# Patient Record
Sex: FEMALE | Employment: UNEMPLOYED | URBAN - NONMETROPOLITAN AREA
[De-identification: names, ages, dates, MRNs, and addresses within clinical notes are randomized per-mention and may not be internally consistent; named-entity substitution may affect disease eponyms.]

---

## 2024-05-23 ENCOUNTER — OUTSIDE SERVICES (OUTPATIENT)
Dept: FAMILY MEDICINE CLINIC | Age: 74
End: 2024-05-23

## 2024-05-23 DIAGNOSIS — I10 ESSENTIAL HYPERTENSION: ICD-10-CM

## 2024-05-23 DIAGNOSIS — M16.0 OSTEOARTHRITIS OF BOTH HIPS, UNSPECIFIED OSTEOARTHRITIS TYPE: ICD-10-CM

## 2024-05-23 DIAGNOSIS — R26.2 DIFFICULTY WALKING: ICD-10-CM

## 2024-05-23 DIAGNOSIS — I26.99 PULMONARY EMBOLISM, UNSPECIFIED CHRONICITY, UNSPECIFIED PULMONARY EMBOLISM TYPE, UNSPECIFIED WHETHER ACUTE COR PULMONALE PRESENT (HCC): ICD-10-CM

## 2024-05-23 DIAGNOSIS — M48.07 SPINAL STENOSIS OF LUMBOSACRAL REGION: Primary | ICD-10-CM

## 2024-05-23 NOTE — PROGRESS NOTES
5/23/2024    Allie Singh  1950    This resident is being seen today for a skilled evaluation visit.  She is a resident who has long-term medical conditions including obesity, atherosclerotic heart disease, chronic obstructive pulmonary disease, difficulty walking, bilateral primary osteoarthritis of the hip, hyperlipidemia, essential hypertension, hypothyroidism, depression, UTI, pulmonary embolism, spinal stenosis to the lumbar sacral region tobacco abuse.  She is a 74 y.o. female resident who is being seen today for skilled services after she was admitted here for physical therapy as well as Occupational Therapy.  She is a resident who will presented to the emergency room with inability to ambulate for approximately 3 weeks.  She was having severe back pain with hip pain with burning sensation to her feet along with some increased edema and had no traumatic injuries.  She did have an ultrasound of the lower extremities with no evidence of DVT.  Her D-dimer was elevated with chest x-ray showing no active pulmonary disease so she was given the benefit of the CT of the pelvis with evidence of advanced degenerative changes to the lower lumbar spine with advanced canal stenosis and neuroforaminal narrowing and advanced degenerative changes of the hips without acute fractures.  She was empirically treated for a UTI at the time of her admission.  At this time, she she does indicate that she has ongoing pain especially to her right hip her lower extremities and her back.  She was very specific that she wanted to increase her Neurontin and change the time of her administration of her Percocet.  I did speak with staff regarding this and I was cautioned to proceed carefully as she does appear to be medication seeking.  She has no headaches or dizziness, sore throat, chest pain, nausea or vomiting, constipation or diarrhea, fever or chills, syncopal events or seizure activity.        Medications:  Albuterol sulfate 2

## 2024-05-30 ENCOUNTER — OUTSIDE SERVICES (OUTPATIENT)
Dept: FAMILY MEDICINE CLINIC | Age: 74
End: 2024-05-30

## 2024-05-30 DIAGNOSIS — E78.2 MIXED HYPERLIPIDEMIA: ICD-10-CM

## 2024-05-30 DIAGNOSIS — E03.9 HYPOTHYROIDISM, UNSPECIFIED TYPE: ICD-10-CM

## 2024-05-30 DIAGNOSIS — M48.07 SPINAL STENOSIS OF LUMBOSACRAL REGION: ICD-10-CM

## 2024-05-30 DIAGNOSIS — M15.9 PRIMARY OSTEOARTHRITIS INVOLVING MULTIPLE JOINTS: Primary | ICD-10-CM

## 2024-05-30 DIAGNOSIS — J44.9 CHRONIC OBSTRUCTIVE PULMONARY DISEASE, UNSPECIFIED COPD TYPE (HCC): ICD-10-CM

## 2024-05-30 NOTE — PROGRESS NOTES
5/30/2024    Allie Singh  1950    This resident is being seen today for a skilled evaluation visit.  She is a resident who has long-term medical conditions including obesity, atherosclerotic heart disease, chronic obstructive pulmonary disease, difficulty walking, bilateral primary osteoarthritis of the hip, hyperlipidemia, essential hypertension, hypothyroidism, depression, UTI, pulmonary embolism, spinal stenosis to the lumbar sacral region and tobacco abuse.  She is a 74 y.o. female resident who is being seen today for a skilled evaluation with which this resident does have a lot of concerns regarding her pain management and indicates that she had always taken her Percocet every 6 hours as needed at home and it is now only every 8 hours as needed she does not feel that it is as beneficial as she needed to be.  I did speak with the director of nursing regarding this and she does admit that she has help take care of this resident from time to time she does have a lot of arthritic pain with some grinding in her joints with any movement.  Furthermore, this resident does state that she has some cough with phlegm like production at times with shortness of breath at times along with chest pain from time to time.  She also states that she has had some degree of leg spasms as well.  She has no nausea or vomiting, constipation or diarrhea, fever or chills, syncopal events or seizure activity.          Medications:  Albuterol sulfate 2 puffs every 4 hours as needed  Aspirin 81 mg daily  Clopidogrel 75 mg daily  Cyclobenzaprine 10 mg daily as needed  Eliquis 5 mg twice daily  Esomeprazole 40 mg daily  Ezetimibe 10 mg daily  Ferrous sulfate 325 mg daily  Gabapentin 100 mg 3 times daily  Metoprolol succinate ER 12.5 mg daily  NicoDerm CQ transdermal patch 21 mg daily  Percocet 7.5-325 mg every 6 hours as needed  Sertraline 50 mg daily  Vitamin D3 50 mcg daily      Objective     Vital Signs: Blood pressure 136/72 pulse 72

## 2024-06-06 ENCOUNTER — OUTSIDE SERVICES (OUTPATIENT)
Dept: FAMILY MEDICINE CLINIC | Age: 74
End: 2024-06-06

## 2024-06-06 DIAGNOSIS — R05.2 SUBACUTE COUGH: Primary | ICD-10-CM

## 2024-06-06 DIAGNOSIS — M15.9 PRIMARY OSTEOARTHRITIS INVOLVING MULTIPLE JOINTS: ICD-10-CM

## 2024-06-06 DIAGNOSIS — I26.99 PULMONARY EMBOLISM, UNSPECIFIED CHRONICITY, UNSPECIFIED PULMONARY EMBOLISM TYPE, UNSPECIFIED WHETHER ACUTE COR PULMONALE PRESENT (HCC): ICD-10-CM

## 2024-06-06 DIAGNOSIS — R26.2 DIFFICULTY WALKING: ICD-10-CM

## 2024-06-06 DIAGNOSIS — I10 ESSENTIAL HYPERTENSION: ICD-10-CM

## 2024-06-06 NOTE — PROGRESS NOTES
6/6/2024    Allie Singh  1950    This resident is being seen today for a skilled evaluation visit.  She is a resident who has long-term medical conditions including obesity, atherosclerotic heart disease, chronic obstructive pulmonary disease, difficulty walking, bilateral primary osteoarthritis of the hip, hyperlipidemia, essential hypertension, hypothyroidism, depression, UTI, pulmonary embolism, spinal stenosis to the lumbar sacral region and tobacco abuse.  She is a 74 y.o. female resident who is being seen today for skilled services with which there is some concern that she has been worried if her Eliquis is working because she states that it feels like she has \"stuff\" in her lungs.  She did indicate that she was feeling better today than she was last night and that they had given her something in terms of a cough syrup.  On today's visit she had brought to my attention that she would like to have her muscle relaxer changed to every 8 hours as needed as opposed to every 24 hours as needed.  I did explain to her that I had just changed her oxycodone as per our discussion every 6 hours as needed and that I am already concerned about her addiction.  She states that she has been on these medications for so long she is aware that she is most likely addicted.  I did still recommend that we wait as she states that she was not aware that she could take the oxycodone every 6 hours as opposed to every 8 hours as needed.  She has no headaches or dizziness, sore throat, chest pain, nausea or vomiting, constipation or diarrhea, fever or chills, syncopal events or seizure activity.        Medications:  Albuterol sulfate 2 puffs every 4 hours as needed  Aspirin 81 mg daily  Clopidogrel 75 mg daily  Cyclobenzaprine 10 mg daily as needed  Eliquis 5 mg twice daily  Esomeprazole 40 mg daily  Ezetimibe 10 mg daily  Ferrous sulfate 325 mg daily  Gabapentin 100 mg 3 times daily  Metoprolol succinate ER 12.5 mg daily  NicoDerm

## 2024-06-10 ENCOUNTER — OUTSIDE SERVICES (OUTPATIENT)
Dept: FAMILY MEDICINE CLINIC | Age: 74
End: 2024-06-10
Payer: MEDICARE

## 2024-06-10 DIAGNOSIS — J06.9 VIRAL URI: Primary | ICD-10-CM

## 2024-06-10 DIAGNOSIS — E03.9 HYPOTHYROIDISM, UNSPECIFIED TYPE: ICD-10-CM

## 2024-06-10 DIAGNOSIS — J44.9 CHRONIC OBSTRUCTIVE PULMONARY DISEASE, UNSPECIFIED COPD TYPE (HCC): ICD-10-CM

## 2024-06-10 DIAGNOSIS — M16.0 OSTEOARTHRITIS OF BOTH HIPS, UNSPECIFIED OSTEOARTHRITIS TYPE: ICD-10-CM

## 2024-06-10 DIAGNOSIS — M48.07 SPINAL STENOSIS OF LUMBOSACRAL REGION: ICD-10-CM

## 2024-06-10 PROCEDURE — 99309 SBSQ NF CARE MODERATE MDM 30: CPT | Performed by: NURSE PRACTITIONER

## 2024-06-10 NOTE — PROGRESS NOTES
6/10/2024    Allie Singh  1950    This resident is being seen today for a skilled evaluation visit.  She is a resident who has long-term medical conditions including obesity, atherosclerotic heart disease, chronic obstructive pulmonary disease, difficulty walking, bilateral primary osteoarthritis of the hip, hyperlipidemia, essential hypertension, hypothyroidism, depression, UTI, pulmonary embolism, spinal stenosis to the lumbar sacral region and tobacco abuse.  She is a 74 y.o. female resident who is being seen today for a skilled evaluation with this resident having some acute concerns with respect to a cough with which I did recommend a chest x-ray on my recent evaluation on 6/6/2024.  Chest x-ray did confirm no acute cardiopulmonary process but she was placed on a Z-Agus due to her symptoms regarding a cough.  She does have a deep harsh productive cough with what she describes as yellow/green phlegm at times.  She indicates that she feels better when she receives a cough syrup and has been utilizing breathing treatments as needed.  She has no chest pain, nausea or vomiting, constipation or diarrhea, fever or chills, syncopal events or seizure activity.          Medications:  Albuterol sulfate 2 puffs every 4 hours as needed  Aspirin 81 mg daily  Clopidogrel 75 mg daily  Cyclobenzaprine 10 mg daily as needed  Eliquis 5 mg twice daily  Esomeprazole 40 mg daily  Ezetimibe 10 mg daily  Ferrous sulfate 325 mg daily  Gabapentin 100 mg 3 times daily  Metoprolol succinate ER 12.5 mg daily  NicoDerm CQ transdermal patch 21 mg daily  Oxycodone/acetaminophen 7.5-325 mg every 6 hours as needed  Sertraline 50 mg daily  Vitamin D3 50 mcg daily      Objective     Vital Signs: Blood pressure 131/86 pulse 69 respirations 18 temperature 97.8 O2 96% weight 228 pounds        Physical examination:Skin is essentially warm and dry. HEENT unremarkable. Neck is supple. Heart regular rate and rhythm. No rubs, gallops or murmurs

## 2024-06-19 ENCOUNTER — OUTSIDE SERVICES (OUTPATIENT)
Dept: FAMILY MEDICINE CLINIC | Age: 74
End: 2024-06-19

## 2024-06-19 DIAGNOSIS — E78.2 MIXED HYPERLIPIDEMIA: ICD-10-CM

## 2024-06-19 DIAGNOSIS — M16.0 OSTEOARTHRITIS OF BOTH HIPS, UNSPECIFIED OSTEOARTHRITIS TYPE: ICD-10-CM

## 2024-06-19 DIAGNOSIS — I10 ESSENTIAL HYPERTENSION: ICD-10-CM

## 2024-06-19 DIAGNOSIS — M48.07 SPINAL STENOSIS OF LUMBOSACRAL REGION: ICD-10-CM

## 2024-06-19 DIAGNOSIS — R26.2 AMBULATORY DYSFUNCTION: Primary | ICD-10-CM

## 2024-06-20 NOTE — PROGRESS NOTES
6/19/2024    Allie Singh  1950    This resident is being seen today for a skilled evaluation visit.  She is a resident who has long-term medical conditions including obesity, atherosclerotic heart disease, chronic obstructive pulmonary disease, difficulty walking, bilateral primary osteoarthritis of the hip, hyperlipidemia, essential hypertension, hypothyroidism, depression, UTI, pulmonary embolism, spinal stenosis to the lumbar sacral region and tobacco abuse.  She is a 74 y.o. female resident who is being seen today for skilled therapy services with which this resident has been under assessment for ongoing pain management as well as a recent cough with which she was treated accordingly with a Z-Agus along with prednisone and prednisone.  She states that her cough has improved quite dramatically and she only notices it in the nighttime hours.  She denies chest pain or shortness of breath, nausea or vomiting, constipation or diarrhea, fever or chills, falls or syncopal events.  Her concern at this time is that she would like to have the benefit of a motorized scooter/wheelchair due to her ongoing debility with limited mobility.  She does continue to utilize a walker just to help her with her standing position and then primarily utilizes a wheelchair due to her underlying problems with respect to difficulty walking with osteoarthritis especially to the bilateral hips with spinal stenosis and underlying COPD.        Medications:  Albuterol sulfate 2 puffs every 4 hours as needed  Aspirin 81 mg daily  Clopidogrel 75 mg daily  Cyclobenzaprine 10 mg daily as needed  Eliquis 5 mg twice daily  Esomeprazole 40 mg daily  Ezetimibe 10 mg daily  Ferrous sulfate 325 mg daily  Gabapentin 100 mg 3 times daily  Metoprolol succinate ER 12.5 mg daily  NicoDerm CQ transdermal patch 21 mg daily  Oxycodone/acetaminophen 7.5-325 mg every 6 hours as needed  Sertraline 50 mg daily  Vitamin D3 50 mcg daily      Objective     Vital

## 2024-06-26 ENCOUNTER — OUTSIDE SERVICES (OUTPATIENT)
Dept: FAMILY MEDICINE CLINIC | Age: 74
End: 2024-06-26
Payer: MEDICARE

## 2024-06-26 DIAGNOSIS — M15.9 PRIMARY OSTEOARTHRITIS INVOLVING MULTIPLE JOINTS: ICD-10-CM

## 2024-06-26 DIAGNOSIS — J44.9 CHRONIC OBSTRUCTIVE PULMONARY DISEASE, UNSPECIFIED COPD TYPE (HCC): ICD-10-CM

## 2024-06-26 DIAGNOSIS — E03.9 HYPOTHYROIDISM, UNSPECIFIED TYPE: ICD-10-CM

## 2024-06-26 DIAGNOSIS — R60.0 PERIPHERAL EDEMA: Primary | ICD-10-CM

## 2024-06-26 DIAGNOSIS — I10 ESSENTIAL HYPERTENSION: ICD-10-CM

## 2024-06-26 PROCEDURE — 99309 SBSQ NF CARE MODERATE MDM 30: CPT | Performed by: NURSE PRACTITIONER

## 2024-06-26 NOTE — PROGRESS NOTES
6/26/2024    Allie Singh  1950    This resident is being seen today for a skilled evaluation visit.  She is a resident who has long-term medical conditions including obesity, atherosclerotic heart disease, chronic obstructive pulmonary disease, difficulty walking, bilateral primary osteoarthritis of the hip, hyperlipidemia, essential hypertension, hypothyroidism, depression, UTI, pulmonary embolism, spinal stenosis to the lumbar sacral region and tobacco abuse.  She is a 74 y.o. female resident who is being seen today for a skilled evaluation with which this resident has been under assessment for peripheral edema which she states has become much worse over the course of the past couple of days.  She states that during her hospitalization they had indicated that she should not have compression hose as they were afraid that she could break off blood clots and send them to her part.  She has however been attempting to elevate throughout the day when possible.  She has no complaints at this time regarding any headaches or dizziness, sore throat, chest pain, coughing or shortness of breath, nausea or vomiting, constipation or diarrhea, fever or chills, falls or syncopal events.          Medications:  Albuterol sulfate 2 puffs every 4 hours as needed  Aspirin 81 mg daily  Clopidogrel 75 mg daily  Cyclobenzaprine 10 mg daily as needed  Eliquis 5 mg twice daily  Esomeprazole 40 mg daily  Ezetimibe 10 mg daily  Ferrous sulfate 325 mg daily  Gabapentin 100 mg 3 times daily  Metoprolol succinate ER 12.5 mg daily  NicoDerm CQ transdermal patch 21 mg daily  Oxycodone/acetaminophen 7.5-325 mg every 6 hours as needed  Sertraline 50 mg daily  Vitamin D3 50 mcg daily      Objective     Vital Signs: Blood pressure 117/66 pulse 67 respirations 18 temperature 97.8 O2 95% weight 227.6 pounds        Physical examination:Skin is essentially warm and dry. HEENT unremarkable. Neck is supple. Heart regular rate and rhythm. No rubs,

## 2024-07-04 ENCOUNTER — OUTSIDE SERVICES (OUTPATIENT)
Dept: FAMILY MEDICINE CLINIC | Age: 74
End: 2024-07-04

## 2024-07-04 DIAGNOSIS — R60.0 PERIPHERAL EDEMA: ICD-10-CM

## 2024-07-04 DIAGNOSIS — R05.2 SUBACUTE COUGH: Primary | ICD-10-CM

## 2024-07-04 DIAGNOSIS — M16.0 OSTEOARTHRITIS OF BOTH HIPS, UNSPECIFIED OSTEOARTHRITIS TYPE: ICD-10-CM

## 2024-07-04 DIAGNOSIS — E78.2 MIXED HYPERLIPIDEMIA: ICD-10-CM

## 2024-07-04 DIAGNOSIS — M48.07 SPINAL STENOSIS OF LUMBOSACRAL REGION: ICD-10-CM

## 2024-07-04 NOTE — PROGRESS NOTES
forthcoming.      YESENIA CAMARGO, APRN - CNP      *Note was creating using voice recognition software.  The document was reviewed however grammatical errors may exist.

## 2024-07-08 ENCOUNTER — OUTSIDE SERVICES (OUTPATIENT)
Dept: FAMILY MEDICINE CLINIC | Age: 74
End: 2024-07-08

## 2024-07-08 DIAGNOSIS — M15.9 PRIMARY OSTEOARTHRITIS INVOLVING MULTIPLE JOINTS: ICD-10-CM

## 2024-07-08 DIAGNOSIS — I10 ESSENTIAL HYPERTENSION: ICD-10-CM

## 2024-07-08 DIAGNOSIS — R05.2 SUBACUTE COUGH: ICD-10-CM

## 2024-07-08 DIAGNOSIS — R60.0 PERIPHERAL EDEMA: Primary | ICD-10-CM

## 2024-07-08 DIAGNOSIS — E03.9 HYPOTHYROIDISM, UNSPECIFIED TYPE: ICD-10-CM

## 2024-07-08 NOTE — PROGRESS NOTES
Abdomen is soft, supple and non-tender.  Bowel sounds are noted x4 quadrants. No rigidity, guarding or rebound tenderness.  Negative Gonzalez's, negative McBurney's, negative Amando's.  Extremities; improved edema to the bilateral lower extremities with peripheral tenderness.  Pulses are adequate. No clubbing  or no cyanosis noted.  Neurologically she  is alert and oriented x3.  No evidence of paralysis or paresthesias noted.    Diagnoses and all orders for this visit:    Peripheral edema  Comments:  Improved with recommendations to downward titrate Zaroxolyn from 2.5 to 1.25 mg every Monday, Wednesday and Friday    Subacute cough  Comments:  Maintain the benefit of Advair discus as resident has noticed improvement    Essential hypertension  Comments:  Maintain aspirin and Plavix along with metoprolol    Hypothyroidism, unspecified type  Comments:  Currently controlled    Primary osteoarthritis involving multiple joints  Comments:  Stable with low-dose gabapentin                        Plan:  Plan of care was discussed with the healthcare team with medications and labs reviewed.  She has had improvement regarding the edema to her lower extremities so I will recommend that her Zaroxolyn be downward titrated to 1.25 mg every Monday, Wednesday and Friday.  She has been to continue with the benefit of her Advair Diskus as I did previously recommend regarding her ongoing cough.  We will await her current labs for any further intervention with recent blood work dated 7/1/2024 with a BUNs/creatinine 17/0.6 with a GFR of 97 H/H12.9/37.9.  She will continue with her current medical management with close observation regarding her edema and maintain the benefit of PT/OT and I will see her routinely and as needed with further orders forthcoming.    YESENIA CAMARGO, APRN - CNP      *Note was creating using voice recognition software.  The document was reviewed however grammatical errors may exist.

## 2024-07-22 ENCOUNTER — OUTSIDE SERVICES (OUTPATIENT)
Dept: FAMILY MEDICINE CLINIC | Age: 74
End: 2024-07-22
Payer: MEDICARE

## 2024-07-22 DIAGNOSIS — I10 ESSENTIAL HYPERTENSION: ICD-10-CM

## 2024-07-22 DIAGNOSIS — M16.0 OSTEOARTHRITIS OF BOTH HIPS, UNSPECIFIED OSTEOARTHRITIS TYPE: ICD-10-CM

## 2024-07-22 DIAGNOSIS — R09.82 POSTNASAL DRIP: ICD-10-CM

## 2024-07-22 DIAGNOSIS — E78.2 MIXED HYPERLIPIDEMIA: ICD-10-CM

## 2024-07-22 DIAGNOSIS — F32.A DEPRESSION, UNSPECIFIED DEPRESSION TYPE: Primary | ICD-10-CM

## 2024-07-22 PROCEDURE — 99309 SBSQ NF CARE MODERATE MDM 30: CPT | Performed by: NURSE PRACTITIONER

## 2024-07-22 NOTE — PROGRESS NOTES
7/22/2024      Allie Singh  1950    This resident is being seen today for a skilled evaluation visit.  She is a resident who has long-term medical conditions including obesity, atherosclerotic heart disease, chronic obstructive pulmonary disease, difficulty walking, bilateral primary osteoarthritis of the hip, hyperlipidemia, essential hypertension, hypothyroidism, depression, UTI, pulmonary embolism, spinal stenosis to the lumbar sacral region and tobacco abuse.  She is a 74 y.o. female resident who is being seen today for skilled therapy with which there has been some staffing concerns with respect to this resident sleeping a lot throughout the day and they feel that she is relatively depressed.  However, the resident indicates that she does sleep all the time and she feels that it is from her history of Fawn-Wise with chronic fatigue that she had been diagnosed with several years ago.  She states that she used to take Provigil and feels that it would be beneficial to be placed back on the medication.  She indicates that she was never really stopped from medication but that the doctor who had prescribed it had passed away and she had not had any issues with which she felt she needed treatment.  Furthermore, she did however state that she feels that she does have some possible underlying depression.  She also states that she had taken B12 injections in times past as well.  She does complain of a cough with some yellowish-brown productive quality to it from time to time with some underlying postnasal drip and states that she is short of breath from time to time.  She has no chest pain, stomach pain, nausea or vomiting, constipation or diarrhea, fever or chills, syncopal events or seizure activity.        Medications:  Albuterol sulfate 2 puffs every 4 hours as needed  Aspirin 81 mg daily  Clopidogrel 75 mg daily  Cyclobenzaprine 10 mg daily as needed  Eliquis 5 mg twice daily  Esomeprazole 40 mg

## 2024-07-31 ENCOUNTER — OUTSIDE SERVICES (OUTPATIENT)
Dept: FAMILY MEDICINE CLINIC | Age: 74
End: 2024-07-31
Payer: MEDICARE

## 2024-07-31 DIAGNOSIS — E53.8 B12 DEFICIENCY: Primary | ICD-10-CM

## 2024-07-31 DIAGNOSIS — M15.9 PRIMARY OSTEOARTHRITIS INVOLVING MULTIPLE JOINTS: ICD-10-CM

## 2024-07-31 DIAGNOSIS — Z86.19 HISTORY OF EPSTEIN-BARR VIRUS INFECTION: ICD-10-CM

## 2024-07-31 DIAGNOSIS — R53.82 CHRONIC FATIGUE: ICD-10-CM

## 2024-07-31 DIAGNOSIS — J44.9 CHRONIC OBSTRUCTIVE PULMONARY DISEASE, UNSPECIFIED COPD TYPE (HCC): ICD-10-CM

## 2024-07-31 PROCEDURE — 99309 SBSQ NF CARE MODERATE MDM 30: CPT | Performed by: NURSE PRACTITIONER

## 2024-08-01 NOTE — PROGRESS NOTES
daily  NicoDerm CQ transdermal patch 21 mg daily  Oxycodone/acetaminophen 7.5-325 mg every 6 hours as needed  Sertraline 75 mg daily  Vitamin D3 50 mcg daily  Zaroxolyn 1.25 mg M-W-F   Advair 250 mcg per 50 mcg 1 elation twice daily for 1 month  Provigil 200 mg every morning  B12 500 mcg M- W-F      Objective     Vital Signs: Blood pressure 130/70 pulse 76 respirations 17 temperature 98.6 O2 96% weight 232 pounds        Physical examination:Skin is essentially warm and dry. HEENT unremarkable. Neck is supple. Heart regular rate and rhythm. No rubs, gallops or murmurs noted.  Lungs are diminished to the bibasilar areas with no evidence of wheezing at this time.  Abdomen is soft, supple and non-tender.  Bowel sounds are noted x4 quadrants. No rigidity, guarding or rebound tenderness.  Negative Gonzalez's, negative McBurney's, negative Amando's.  Extremities; mild to 1+ edema to the bilateral lower extremities with peripheral tenderness.  Pulses are adequate. No clubbing  or no cyanosis noted.  Neurologically she  is alert and oriented x3.  No evidence of paralysis or paresthesias noted.    Diagnoses and all orders for this visit:    B12 deficiency  Comments:  Initiate B12 500 mcg M-W-F and repeat B12 level in 3 months    Chronic fatigue  Comments:  Initiate Provigil 200 mg daily as per discussion with Dr. Hollis due to history of Fawn-Wise    History of Fawn-Barr virus infection  Comments:  Resulting in chronic fatigue with recommendations for Provigil 200 mg daily    Primary osteoarthritis involving multiple joints  Comments:  Continue with cyclobenzaprine as needed    Chronic obstructive pulmonary disease, unspecified COPD type (HCC)  Comments:  Stable with Advair                            Plan:  Plan of care was discussed with the healthcare team with medications and labs reviewed.  This resident did have recent lab testing 7/29/2024 with a glucose of 91 BUNs/creatinine 26/0.7 with a GFR of 82 H/H12.7/39.1 and a

## 2024-08-12 ENCOUNTER — OUTSIDE SERVICES (OUTPATIENT)
Dept: FAMILY MEDICINE CLINIC | Age: 74
End: 2024-08-12

## 2024-08-12 DIAGNOSIS — M54.50 ACUTE MIDLINE LOW BACK PAIN WITHOUT SCIATICA: ICD-10-CM

## 2024-08-12 DIAGNOSIS — F32.A DEPRESSION, UNSPECIFIED DEPRESSION TYPE: ICD-10-CM

## 2024-08-12 DIAGNOSIS — I10 ESSENTIAL HYPERTENSION: ICD-10-CM

## 2024-08-12 DIAGNOSIS — E03.9 HYPOTHYROIDISM, UNSPECIFIED TYPE: ICD-10-CM

## 2024-08-12 DIAGNOSIS — M62.838 MUSCLE SPASMS OF BOTH LOWER EXTREMITIES: Primary | ICD-10-CM

## 2024-08-12 NOTE — PROGRESS NOTES
low-dose Remeron 7.5 mg at bedtime due to her depression.  I would like to complete a magnesium level on the next lab day due to her spasms with some question of cramping, which she states she has had in times past.  I will furthermore x-ray her lumbar sacral spine due to her pain with history of falls.  She will maintain the benefit of her current management with PT/OT and I will see her routinely and as needed with further orders forthcoming.    YESENIA CAMARGO, APRN - CNP      *Note was creating using voice recognition software.  The document was reviewed however grammatical errors may exist.